# Patient Record
Sex: MALE | Race: OTHER | HISPANIC OR LATINO | URBAN - METROPOLITAN AREA
[De-identification: names, ages, dates, MRNs, and addresses within clinical notes are randomized per-mention and may not be internally consistent; named-entity substitution may affect disease eponyms.]

---

## 2017-12-26 ENCOUNTER — EMERGENCY (EMERGENCY)
Facility: HOSPITAL | Age: 37
LOS: 1 days | Discharge: HOME | End: 2017-12-26
Attending: EMERGENCY MEDICINE
Payer: SELF-PAY

## 2017-12-26 VITALS
TEMPERATURE: 99 F | SYSTOLIC BLOOD PRESSURE: 126 MMHG | OXYGEN SATURATION: 97 % | RESPIRATION RATE: 18 BRPM | HEART RATE: 79 BPM | DIASTOLIC BLOOD PRESSURE: 62 MMHG

## 2017-12-26 VITALS — HEIGHT: 69 IN | WEIGHT: 149.91 LBS

## 2017-12-26 PROCEDURE — 99284 EMERGENCY DEPT VISIT MOD MDM: CPT | Mod: 25

## 2017-12-26 PROCEDURE — 72100 X-RAY EXAM L-S SPINE 2/3 VWS: CPT | Mod: 26

## 2017-12-26 PROCEDURE — 96374 THER/PROPH/DIAG INJ IV PUSH: CPT

## 2017-12-26 PROCEDURE — 72100 X-RAY EXAM L-S SPINE 2/3 VWS: CPT

## 2017-12-26 PROCEDURE — 99283 EMERGENCY DEPT VISIT LOW MDM: CPT

## 2017-12-26 RX ORDER — LIDOCAINE 4 G/100G
1 CREAM TOPICAL ONCE
Qty: 0 | Refills: 0 | Status: COMPLETED | OUTPATIENT
Start: 2017-12-26 | End: 2017-12-26

## 2017-12-26 RX ORDER — DIAZEPAM 5 MG
5 TABLET ORAL ONCE
Qty: 0 | Refills: 0 | Status: DISCONTINUED | OUTPATIENT
Start: 2017-12-26 | End: 2017-12-26

## 2017-12-26 RX ORDER — KETOROLAC TROMETHAMINE 30 MG/ML
15 SYRINGE (ML) INJECTION ONCE
Qty: 0 | Refills: 0 | Status: DISCONTINUED | OUTPATIENT
Start: 2017-12-26 | End: 2017-12-26

## 2017-12-26 RX ADMIN — LIDOCAINE 1 PATCH: 4 CREAM TOPICAL at 18:52

## 2017-12-26 RX ADMIN — Medication 5 MILLIGRAM(S): at 18:35

## 2017-12-26 RX ADMIN — Medication 15 MILLIGRAM(S): at 18:35

## 2017-12-26 NOTE — ED PROVIDER NOTE - PROGRESS NOTE DETAILS
Rosario AKINS: Patient reassessed and reports feeling better. Patient denies leg pain or weakness. He will go home with Lidocaine patch and Valium 5 mg PO

## 2017-12-26 NOTE — ED PROVIDER NOTE - OBJECTIVE STATEMENT
38 y/o w/ no significant PMHx, presents to ED c/o lower back pain. Pt is a , reports acute onset of b/l lumbar pain x 3 days, associated w/ shooting pain down his R lower extremity. Pt denies falls, trauma, skin rash, urinary incontinence, urinary retention, paresthesia, or any other complaints. Pt reports pain is worse w/ bending forward in motion. No alleviating factors. NKDA.

## 2017-12-26 NOTE — ED PROVIDER NOTE - MEDICAL DECISION MAKING DETAILS
38 y/o M w/ no significant PMHx presenting w/ acute onset of back pain.  No signs or symptoms of neurological compromise. Will suspect muscular strain, sciatica, radiculopathy or piriformis syndrome, will symptoms control and reassess.

## 2017-12-26 NOTE — ED PROVIDER NOTE - MUSCULOSKELETAL, MLM
Spine appears normal, range of motion is not limited, tenderness at R sacral iliac joint, positive straight leg test at 10 degrees.

## 2017-12-26 NOTE — ED ADULT NURSE NOTE - OBJECTIVE STATEMENT
Pt AOx3, ambulatory, c/o lower back pain since yesterday. pt states he was lifting boxes. PT denies, n/v/ dysuria, or problems with BM. No ROM deficit noted.

## 2017-12-26 NOTE — ED PROVIDER NOTE - NS_ ATTENDINGSCRIBEDETAILS _ED_A_ED_FT
The scribe's documentation has been prepared under my direction and personally reviewed by me in its entirety. I confirm that the note above accurately reflects all work, treatment, procedures, and medical decision making performed by me (Dr. Danny Fernandez).
